# Patient Record
Sex: FEMALE | URBAN - METROPOLITAN AREA
[De-identification: names, ages, dates, MRNs, and addresses within clinical notes are randomized per-mention and may not be internally consistent; named-entity substitution may affect disease eponyms.]

---

## 2020-07-12 ENCOUNTER — HOSPITAL ENCOUNTER (OUTPATIENT)
Dept: TELEMEDICINE | Facility: HOSPITAL | Age: 50
Discharge: HOME | End: 2020-07-12

## 2020-07-12 DIAGNOSIS — G40.901 CONVULSIVE STATUS EPILEPTICUS: ICD-10-CM

## 2020-07-12 PROCEDURE — G0508 CRIT CARE TELEHEA CONSULT 60: HCPCS | Mod: GT,,, | Performed by: PSYCHIATRY & NEUROLOGY

## 2020-07-12 PROCEDURE — G0508 PR CRITICAL CARE TELEHLTH INITIAL CONSULT 60MIN: ICD-10-PCS | Mod: GT,,, | Performed by: PSYCHIATRY & NEUROLOGY

## 2020-07-12 NOTE — CONSULTS
[facility]  Vascular Neurology  Comprehensive Stroke Center  Tele-Consultation Note      Consults    Consulting Provider: JSOE JUAREZ  Current Providers  No providers found    Patient Location: [facility] - TELEMEDICINE ED [facility] TRANSFER C[facility] IP Unit  [facility] nurse at bedside with patient assisting consultant.     Patient information was obtained from RN.         Assessment/Plan:     STROKE DOCUMENTATION     Acute Stroke Times:   Acute Stroke Times   Stroke Team Called Time: 1840  Stroke Team Arrival Time: 1840  CT Interpretation Time: 1840    NIH Scale:  Reason Unable to Complete: (active seizure)    Modified [place]    [place] Coma Scale:    ABCD2 Score:    [id number] Score:   HAS -BLED Score:   ICH Score:   Hunt & Gonzales Classification:       Diagnoses:   Convulsive status epilepticus  Multiple rounds of focal left sided seizures w/ postictal Iam's paralysis.  CT w/o blood.  On full dose Lovenox for PE/DVT, recent COVID ICU admission w/ stepdown yesterday.  Examined briefly when she came out of CT in CT hallway, actively seizing.  Recommend benzodiazepine for Rx of status epilepticus as well as loading an AED per hospital staff [place] [facility]        There were no vitals taken for this visit.  Alteplase Eligible?: No  Alteplase Recommendation: Alteplase not recommended due to Full dose anticoagulation   Possible Interventional Revascularization Candidate? Unlikely, active seizures x 5 with posticatal Todds    Disposition Recommendation: remains as inpatient at [facility] [place]     Subjective:     History of Present Illness:  49F w/ left arm twitching with possible seizure, left side is flaccid now. This has happened 5 times since 6 pm. Admitted for sepsis, fluid overload, COVID+, stepped down from ICU yesterday. ESRD, PE/DVT.  Lovenox injection full dose.      Woke up with symptoms?: no    Recent bleeding noted: no  Does the patient take any Blood  Thinners? yes  Medications: Anticoagulants:  enoxaparin/Lovenox      Past Medical History:   No past medical history on file.    Past Surgical History: no major surgeries within the last 2 weeks    Family History: no relevant history    Social History: no smoking, no drinking, no drugs    Allergies: Allergies have not been reviewed No relevant allergies    Review of Systems   Constitutional: Negative for appetite change.   HENT: Negative for congestion.    Eyes: Negative for discharge.   Respiratory: Negative for shortness of breath.    Cardiovascular: Negative for chest pain.   Gastrointestinal: Negative for abdominal pain.   Endocrine: Negative for cold intolerance.   Genitourinary: Negative for difficulty urinating.   Musculoskeletal: Negative for joint swelling.   Skin: Negative for color change.     Objective:     CT READ: Yes  No hemmorhage. No mass effect. No early infarct signs.  Large R MCA remote infarct    Physical Exam  Constitutional:       General: She is not in acute distress.  HENT:      Head: Normocephalic.      Right Ear: External ear normal.      Left Ear: External ear normal.   Eyes:      Conjunctiva/sclera: Conjunctivae normal.   Neck:      Musculoskeletal: Normal range of motion.   Pulmonary:      Effort: Pulmonary effort is normal.      Breath sounds: No stridor.   Abdominal:      Tenderness: There is no abdominal tenderness.   Skin:     General: Skin is dry.      Findings: No rash.               Recommended the emergency room physician to have a brief discussion with the patient and/or family if available regarding the risks and benefits of treatment, and to briefly document the occurrence of that discussion in his clinical encounter note.     The attending portion of this evaluation, treatment, and documentation was performed per Dom Murcia MD via audiovisual.    Billing code:  (time dependent stroke, complex case, unstable patient, hemorrhages, any intervention, some mimics)     · This patient has a very critical neurological condition/illness, with very high morbidity and mortality.  · There is a very high probability for acute neurological change leading to clinical and possibly life-threatening deterioration requiring highest level of physician preparedness for urgent intervention.  · There is possibility that this condition will require treatment with high risk medications as quickly as possible.  · There is also a possibility that the patient may benefit from further, more advance complex therapies (e.g. endovascular therapy) that will require prompt diagnosis and care.  · Care was coordinated with other physicians involved in the patient's care.  · Radiologic studies and laboratory data were reviewed and interpreted, and plan of care was re-assessed based on the results.  · Diagnosis, treatment options and prognosis may have been discussed with the patient and/or family members or caregiver.  · Further advanced medical management and further evaluation is warranted for his care.      In your opinion, this was a:  N/A    Consult End Time: 6:56 PM     Dom Murcia MD  Comprehensive Stroke Center  Vascular Neurology   [facility]

## 2020-07-12 NOTE — HPI
49F w/ left arm twitching with possible seizure, left side is flaccid now. This has happened 5 times since 6 pm. Admitted for sepsis, fluid overload, COVID+, stepped down from ICU yesterday. ESRD, PE/DVT.  Lovenox injection full dose.

## 2020-07-12 NOTE — SUBJECTIVE & OBJECTIVE
Woke up with symptoms?: no    Recent bleeding noted: no  Does the patient take any Blood Thinners? yes  Medications: Anticoagulants:  enoxaparin/Lovenox      Past Medical History:   No past medical history on file.    Past Surgical History: no major surgeries within the last 2 weeks    Family History: no relevant history    Social History: no smoking, no drinking, no drugs    Allergies: Allergies have not been reviewed No relevant allergies    Review of Systems   Constitutional: Negative for appetite change.   HENT: Negative for congestion.    Eyes: Negative for discharge.   Respiratory: Negative for shortness of breath.    Cardiovascular: Negative for chest pain.   Gastrointestinal: Negative for abdominal pain.   Endocrine: Negative for cold intolerance.   Genitourinary: Negative for difficulty urinating.   Musculoskeletal: Negative for joint swelling.   Skin: Negative for color change.     Objective:     CT READ: Yes  No hemmorhage. No mass effect. No early infarct signs.  Large R MCA remote infarct    Physical Exam  Constitutional:       General: She is not in acute distress.  HENT:      Head: Normocephalic.      Right Ear: External ear normal.      Left Ear: External ear normal.   Eyes:      Conjunctiva/sclera: Conjunctivae normal.   Neck:      Musculoskeletal: Normal range of motion.   Pulmonary:      Effort: Pulmonary effort is normal.      Breath sounds: No stridor.   Abdominal:      Tenderness: There is no abdominal tenderness.   Skin:     General: Skin is dry.      Findings: No rash.

## 2020-07-12 NOTE — ASSESSMENT & PLAN NOTE
Multiple rounds of focal left sided seizures w/ postictal Iam's paralysis.  CT w/o blood.  On full dose Lovenox for PE/DVT, recent COVID ICU admission w/ stepdown yesterday.  Examined briefly when she came out of CT in CT hallway, actively seizing.  Recommend benzodiazepine for Rx of status epilepticus as well as loading an AED per hospital staff / neurology @ [facility].